# Patient Record
(demographics unavailable — no encounter records)

---

## 2025-01-13 NOTE — HISTORY OF PRESENT ILLNESS
[de-identified] : sick [FreeTextEntry6] : 3y/o M p/w fever x3 nights, 104 max.  Putting hands in mouth.  Tolerating PO, voiding well.  More tired.  Last motrin 2am.

## 2025-01-13 NOTE — DISCUSSION/SUMMARY
[FreeTextEntry1] : 2 year boy found to be rapid strep positive. Complete 10 days of antibiotics. Side effect of antibiotics includes but not limited to diarrhea. Use antipyretics as needed. After being on antibiotics for atleast 24 hours patient less likely to spread infection. - Return precautions reviewed. Patient to seek medical attention in ED if has decreased oral intake, decrease in wet diapers/voids, fever >100.4F, difficulty breathing, becomes lethargic, or has a change in mental status or alertness. To note if fever > 5 days must be seen immediately either in clinic or in ED. - Return/ED precautions given.  RTC routine and prn.  Caretaker expressed understanding and all questions answered.

## 2025-03-04 NOTE — HISTORY OF PRESENT ILLNESS
[Parents] : parents [Normal] : Normal [Pacifier use] : Pacifier use [Yes] : Patient goes to dentist yearly [Tap water] : Primary Fluoride Source: Tap water [Toilet Training] : Toilet training [No] : Not at  exposure [Smoke Detectors] : Smoke detectors [Carbon Monoxide Detectors] : Carbon monoxide detectors [Up to date] : Up to date [NO] : No [FreeTextEntry7] : Doing well - no major hospitalizations or ED visits  [de-identified] : - well balanced, no new food allergies, sometimes picky eater

## 2025-03-04 NOTE — PHYSICAL EXAM
[Alert] : alert [No Acute Distress] : no acute distress [Normocephalic] : normocephalic [Anterior [place] Closed] : anterior fontanelle closed [Red Reflex Bilateral] : red reflex bilateral [PERRL] : PERRL [Normally Placed Ears] : normally placed ears [Auricles Well Formed] : auricles well formed [Clear Tympanic membranes with present light reflex and bony landmarks] : clear tympanic membranes with present light reflex and bony landmarks [No Discharge] : no discharge [Nares Patent] : nares patent [Palate Intact] : palate intact [Uvula Midline] : uvula midline [Tooth Eruption] : tooth eruption  [Supple, full passive range of motion] : supple, full passive range of motion [No Palpable Masses] : no palpable masses [Symmetric Chest Rise] : symmetric chest rise [Clear to Auscultation Bilaterally] : clear to auscultation bilaterally [Regular Rate and Rhythm] : regular rate and rhythm [S1, S2 present] : S1, S2 present [No Murmurs] : no murmurs [+2 Femoral Pulses] : +2 femoral pulses [Soft] : soft [NonTender] : non tender [Non Distended] : non distended [Normoactive Bowel Sounds] : normoactive bowel sounds [No Hepatomegaly] : no hepatomegaly [No Splenomegaly] : no splenomegaly [Brian 1] : Brian 1 [Circumcised] : circumcised [Central Urethral Opening] : central urethral opening [Testicles Descended Bilaterally] : testicles descended bilaterally [Normally Placed] : normally placed [No Abnormal Lymph Nodes Palpated] : no abnormal lymph nodes palpated [No Clavicular Crepitus] : no clavicular crepitus [Symmetric Buttocks Creases] : symmetric buttocks creases [No Spinal Dimple] : no spinal dimple [NoTuft of Hair] : no tuft of hair [Cranial Nerves Grossly Intact] : cranial nerves grossly intact [No Rash or Lesions] : no rash or lesions

## 2025-03-04 NOTE — DISCUSSION/SUMMARY
[Normal Growth] : growth [Normal Development] : development [None] : No known medical problems [No Elimination Concerns] : elimination [No Feeding Concerns] : feeding [No Skin Concerns] : skin [Normal Sleep Pattern] : sleep [Assessment of Language Development] : assessment of language development [Temperament and Behavior] : temperament and behavior [Toilet Training] : toilet training [TV Viewing] : tv viewing [Safety] : safety [No Medications] : ~He/She~ is not on any medications [Parent/Guardian] : parent/guardian [] : The components of the vaccine(s) to be administered today are listed in the plan of care. The disease(s) for which the vaccine(s) are intended to prevent and the risks have been discussed with the caretaker.  The risks are also included in the appropriate vaccination information statements which have been provided to the patient's caregiver.  The caregiver has given consent to vaccinate. [FreeTextEntry3] : - Labs drawn in office by MD Nel Smith - RTC 2 weeks for Varicella immunization

## 2025-06-23 NOTE — DISCUSSION/SUMMARY
[Normal Growth] : growth [Normal Development] : development [No Elimination Concerns] : elimination [No Feeding Concerns] : feeding [No Skin Concerns] : skin [Normal Sleep Pattern] : sleep [Assessment of Language Development] : assessment of language development [Temperament and Behavior] : temperament and behavior [Toilet Training] : toilet training [Safety] : safety [No Medications] : ~He/She~ is not on any medications [Parent/Guardian] : parent/guardian [FreeTextEntry4] : occasional leg pain

## 2025-06-23 NOTE — PHYSICAL EXAM

## 2025-06-23 NOTE — HISTORY OF PRESENT ILLNESS
You can access the FollowMyHealth Patient Portal offered by [facility] by registering at the following website: [email or web address]. By joining [facility]’s FollowMyHealth portal, you will also be able to view your health information using other applications (apps) compatible with our system. [Mother] : mother [Father] : father [Fruit] : fruit [Vegetables] : vegetables [Meat] : meat [Grains] : grains [Eggs] : eggs [Fish] : fish [Dairy] : dairy [Normal] : Normal [Brushing teeth] : Brushing teeth [Toothpaste] : Primary Fluoride Source: Toothpaste [No] : Not at  exposure [Water heater temperature set at <120 degrees F] : Water heater temperature set at <120 degrees F [Car seat in back seat] : Car seat in back seat [Carbon Monoxide Detectors] : Carbon monoxide detectors [Smoke Detectors] : Smoke detectors [Supervised play near cars and streets] : Supervised play near cars and streets [___ stools per day] : [unfilled]  stools per day [___ voids per day] : [unfilled] voids per day [In bed] : In bed [Yes] : Patient goes to dentist yearly [Exposure to electronic nicotine delivery system] : No exposure to electronic nicotine delivery system [de-identified] : abx completed for strep and ear infection - still swollen tonsils per mom [FreeTextEntry3] : sleeps with mom and dad

## 2025-07-25 NOTE — HISTORY OF PRESENT ILLNESS
[FreeTextEntry6] : Canker sore in the back of his thraot, drooling, gums are swollen x3 days. Tonsils are swollen, lost his voice. Had loose stool yesterday.  No sick contacts.  Decreased appetite, drinking well.  went to family function - cousin with hfmd Had strep throat 2 weeks ago, went to , treated with Augmentin. Strep culture was negative.   No fever, vomiting or rash.

## 2025-07-25 NOTE — DISCUSSION/SUMMARY
[FreeTextEntry1] : viral sores oropharynx - herpangina likely viral swab to r/o hsv for herpetic gingivostomatitis - oral antiviral if positive swab  Continue supportive care. Return precautions reviewed. Patient to be brought to the ED if has persistent decreased oral intake, decrease in wet diapers, fever >100.4F or becomes patient becomes lethargic or changed in mental status and alertness. To note if fever > 5 days must be seen immediately either in clinic or in ED.